# Patient Record
Sex: MALE | Race: OTHER | NOT HISPANIC OR LATINO | URBAN - METROPOLITAN AREA
[De-identification: names, ages, dates, MRNs, and addresses within clinical notes are randomized per-mention and may not be internally consistent; named-entity substitution may affect disease eponyms.]

---

## 2021-08-28 ENCOUNTER — TELEPHONE ENCOUNTER (OUTPATIENT)
Dept: URBAN - METROPOLITAN AREA CLINIC 13 | Facility: CLINIC | Age: 61
End: 2021-08-28

## 2021-08-29 ENCOUNTER — TELEPHONE ENCOUNTER (OUTPATIENT)
Dept: URBAN - METROPOLITAN AREA CLINIC 13 | Facility: CLINIC | Age: 61
End: 2021-08-29

## 2022-03-03 ENCOUNTER — OFFICE VISIT (OUTPATIENT)
Dept: URBAN - METROPOLITAN AREA CLINIC 48 | Facility: CLINIC | Age: 62
End: 2022-03-03
Payer: COMMERCIAL

## 2022-03-03 ENCOUNTER — DASHBOARD ENCOUNTERS (OUTPATIENT)
Age: 62
End: 2022-03-03

## 2022-03-03 VITALS
DIASTOLIC BLOOD PRESSURE: 75 MMHG | HEART RATE: 80 BPM | OXYGEN SATURATION: 94 % | BODY MASS INDEX: 38.36 KG/M2 | HEIGHT: 76 IN | SYSTOLIC BLOOD PRESSURE: 158 MMHG | TEMPERATURE: 98.1 F | WEIGHT: 315 LBS

## 2022-03-03 DIAGNOSIS — R10.13 EPIGASTRIC PAIN: ICD-10-CM

## 2022-03-03 DIAGNOSIS — K92.1 MELENA: ICD-10-CM

## 2022-03-03 DIAGNOSIS — D50.9 IRON DEFICIENCY ANEMIA, UNSPECIFIED IRON DEFICIENCY ANEMIA TYPE: ICD-10-CM

## 2022-03-03 DIAGNOSIS — Z83.71 FAMILY HISTORY OF COLONIC POLYPS: ICD-10-CM

## 2022-03-03 PROBLEM — 87522002: Status: ACTIVE | Noted: 2022-03-03

## 2022-03-03 PROCEDURE — 99204 OFFICE O/P NEW MOD 45 MIN: CPT | Performed by: INTERNAL MEDICINE

## 2022-03-03 RX ORDER — LOSARTAN POTASSIUM 100 MG/1
TAKE ONE TABLET BY MOUTH ONE TIME DAILY TABLET ORAL
Qty: 90 | Refills: 1 | Status: ACTIVE | COMMUNITY

## 2022-03-03 RX ORDER — METFORMIN HYDROCHLORIDE 500 MG/1
1 TABLET WITH EVENING MEAL TABLET, EXTENDED RELEASE ORAL TWICE A DAY
Status: ACTIVE | COMMUNITY

## 2022-03-03 RX ORDER — PRAVASTATIN SODIUM 80 MG/1
TAKE ONE TABLET BY MOUTH ONE TIME DAILY TABLET ORAL
Qty: 90 | Refills: 1 | Status: ACTIVE | COMMUNITY

## 2022-03-03 RX ORDER — CARVEDILOL 25 MG/1
TAKE TWO TABLETS BY MOUTH TWICE A DAY WITH A MEAL TABLET, FILM COATED ORAL
Qty: 360 | Refills: 1 | Status: ACTIVE | COMMUNITY

## 2022-03-03 RX ORDER — PIOGLITAZONE 45 MG/1
TAKE ONE TABLET BY MOUTH ONE TIME DAILY FOR DIABETES TABLET ORAL
Qty: 90 | Refills: 1 | Status: ACTIVE | COMMUNITY

## 2022-03-03 RX ORDER — ERGOCALCIFEROL 1.25 MG/1
NULL DIAGNOSIS UNAVAILABLE CAPSULE, LIQUID FILLED ORAL
Qty: 12 | Refills: 1 | Status: ACTIVE | COMMUNITY

## 2022-03-03 RX ORDER — ALLOPURINOL 300 MG/1
TAKE ONE TABLET BY MOUTH ONE TIME DAILY AS NEEDED FOR GOUT PREVENTION TABLET ORAL
Qty: 90 | Refills: 1 | Status: ACTIVE | COMMUNITY

## 2022-03-03 RX ORDER — GLIPIZIDE 10 MG/1
TAKE ONE TABLET BY MOUTH TWICE A DAY WITH MEALS TABLET, FILM COATED, EXTENDED RELEASE ORAL
Qty: 180 | Refills: 0 | Status: ACTIVE | COMMUNITY

## 2022-03-03 RX ORDER — POTASSIUM CHLORIDE 750 MG/1
TAKE ONE CAPSULE BY MOUTH TWICE A DAY CAPSULE, EXTENDED RELEASE ORAL
Qty: 179 | Refills: 0 | Status: ACTIVE | COMMUNITY

## 2022-03-03 RX ORDER — VITAMIN A 2400 MCG
1 TABLET CAPSULE ORAL ONCE A DAY
Status: ACTIVE | COMMUNITY

## 2022-03-03 RX ORDER — GABAPENTIN 100 MG/1
TAKE 1 TO 2 CAPSULES BY MOUTH AT BEDTIME AS NEEDED FOR NERVE PAIN CAPSULE ORAL
Qty: 50 | Refills: 1 | Status: ACTIVE | COMMUNITY

## 2022-03-03 RX ORDER — DOXAZOSIN 4 MG/1
TAKE ONE TABLET BY MOUTH AT BEDTIME TABLET ORAL
Qty: 90 | Refills: 1 | Status: ACTIVE | COMMUNITY

## 2022-03-03 RX ORDER — ISOSORBIDE MONONITRATE 30 MG/1
TAKE ONE TABLET BY MOUTH ONE TIME DAILY TABLET, EXTENDED RELEASE ORAL
Qty: 90 | Refills: 1 | Status: ACTIVE | COMMUNITY

## 2022-03-03 RX ORDER — OMEPRAZOLE 40 MG/1
TAKE ONE CAPSULE BY MOUTH ONE TIME DAILY CAPSULE, DELAYED RELEASE ORAL
Qty: 90 | Refills: 0 | Status: ACTIVE | COMMUNITY

## 2022-03-03 RX ORDER — SUCRALFATE 1 G/1
1 TABLET ON AN EMPTY STOMACH TABLET ORAL THREE TIMES A DAY
Qty: 42 TABLET | Refills: 1 | OUTPATIENT
Start: 2022-03-03 | End: 2022-03-31

## 2022-03-03 RX ORDER — ASPIRIN 81 MG/1
TAKE ONE TABLET BY MOUTH ONE TIME DAILY WITH FOOD TABLET, COATED ORAL
Qty: 90 | Refills: 1 | Status: ACTIVE | COMMUNITY

## 2022-03-03 RX ORDER — NIFEDIPINE 30 MG/1
TAKE ONE TABLET BY MOUTH ONE TIME DAILY TABLET, EXTENDED RELEASE ORAL
Qty: 90 | Refills: 1 | Status: ACTIVE | COMMUNITY

## 2022-03-03 NOTE — HPI-TODAY'S VISIT:
Eliceo Kerns is a 60 y/o male who is being referred by Ledy Bustos NP, for evaluation of dark stools and anemia. Labs were checked 3/1 showing Hgb of 9.7; MCV 93; Iron and % saturation low at 30 and 10 respectively, and has just been started on oral iron supplentation. Per review of EPIC chart, he has chronic anemia, but Hgb has steadily dropped as it was 12.4 in 6/2021 and 11 in 11/2021. He states he has noticed his stools appearing black over the last couple of weeks; having 1 formed stool/day. He also sees occasional BRBPR which he attributes to internal hemorrhoids. He takes 81 mg aspirin daily and is also on Xarelto for A-fib (recently changed from Eliquis due to insurance coverage). He recently took Ibuprofen daily for a couple of weeks. He has taken Omeprazole 40 mg on an as needed basis for upper abdominal pains, but recently has been taking it daily, and pain has improved. He has not had a colonoscopy since 2008 which showed internal hemorrhoids; he reports h/o colon polyps in his brother. Additionally, he mentions having Alpha-gal allergy.

## 2022-03-09 ENCOUNTER — OFFICE VISIT (OUTPATIENT)
Dept: URBAN - METROPOLITAN AREA SURGERY CENTER 28 | Facility: SURGERY CENTER | Age: 62
End: 2022-03-09
Payer: COMMERCIAL

## 2022-03-09 DIAGNOSIS — D50.9 ANEMIA: ICD-10-CM

## 2022-03-09 DIAGNOSIS — K64.1 BLEEDING GRADE II HEMORRHOIDS: ICD-10-CM

## 2022-03-09 DIAGNOSIS — K57.30 ACQUIRED DIVERTICULOSIS OF COLON: ICD-10-CM

## 2022-03-09 DIAGNOSIS — K92.1 ACUTE MELENA: ICD-10-CM

## 2022-03-09 PROCEDURE — 43235 EGD DIAGNOSTIC BRUSH WASH: CPT | Performed by: INTERNAL MEDICINE

## 2022-03-09 PROCEDURE — G8907 PT DOC NO EVENTS ON DISCHARG: HCPCS | Performed by: INTERNAL MEDICINE

## 2022-03-09 PROCEDURE — 45378 DIAGNOSTIC COLONOSCOPY: CPT | Performed by: INTERNAL MEDICINE

## 2022-03-09 RX ORDER — DOXAZOSIN 4 MG/1
TAKE ONE TABLET BY MOUTH AT BEDTIME TABLET ORAL
Qty: 90 | Refills: 1 | Status: ACTIVE | COMMUNITY

## 2022-03-09 RX ORDER — PIOGLITAZONE 45 MG/1
TAKE ONE TABLET BY MOUTH ONE TIME DAILY FOR DIABETES TABLET ORAL
Qty: 90 | Refills: 1 | Status: ACTIVE | COMMUNITY

## 2022-03-09 RX ORDER — CARVEDILOL 25 MG/1
TAKE TWO TABLETS BY MOUTH TWICE A DAY WITH A MEAL TABLET, FILM COATED ORAL
Qty: 360 | Refills: 1 | Status: ACTIVE | COMMUNITY

## 2022-03-09 RX ORDER — ASPIRIN 81 MG/1
TAKE ONE TABLET BY MOUTH ONE TIME DAILY WITH FOOD TABLET, COATED ORAL
Qty: 90 | Refills: 1 | Status: ACTIVE | COMMUNITY

## 2022-03-09 RX ORDER — LOSARTAN POTASSIUM 100 MG/1
TAKE ONE TABLET BY MOUTH ONE TIME DAILY TABLET ORAL
Qty: 90 | Refills: 1 | Status: ACTIVE | COMMUNITY

## 2022-03-09 RX ORDER — ERGOCALCIFEROL 1.25 MG/1
NULL DIAGNOSIS UNAVAILABLE CAPSULE, LIQUID FILLED ORAL
Qty: 12 | Refills: 1 | Status: ACTIVE | COMMUNITY

## 2022-03-09 RX ORDER — GLIPIZIDE 10 MG/1
TAKE ONE TABLET BY MOUTH TWICE A DAY WITH MEALS TABLET, FILM COATED, EXTENDED RELEASE ORAL
Qty: 180 | Refills: 0 | Status: ACTIVE | COMMUNITY

## 2022-03-09 RX ORDER — ALLOPURINOL 300 MG/1
TAKE ONE TABLET BY MOUTH ONE TIME DAILY AS NEEDED FOR GOUT PREVENTION TABLET ORAL
Qty: 90 | Refills: 1 | Status: ACTIVE | COMMUNITY

## 2022-03-09 RX ORDER — VITAMIN A 2400 MCG
1 TABLET CAPSULE ORAL ONCE A DAY
Status: ACTIVE | COMMUNITY

## 2022-03-09 RX ORDER — GABAPENTIN 100 MG/1
TAKE 1 TO 2 CAPSULES BY MOUTH AT BEDTIME AS NEEDED FOR NERVE PAIN CAPSULE ORAL
Qty: 50 | Refills: 1 | Status: ACTIVE | COMMUNITY

## 2022-03-09 RX ORDER — ISOSORBIDE MONONITRATE 30 MG/1
TAKE ONE TABLET BY MOUTH ONE TIME DAILY TABLET, EXTENDED RELEASE ORAL
Qty: 90 | Refills: 1 | Status: ACTIVE | COMMUNITY

## 2022-03-09 RX ORDER — METFORMIN HYDROCHLORIDE 500 MG/1
1 TABLET WITH EVENING MEAL TABLET, EXTENDED RELEASE ORAL TWICE A DAY
Status: ACTIVE | COMMUNITY

## 2022-03-09 RX ORDER — POTASSIUM CHLORIDE 750 MG/1
TAKE ONE CAPSULE BY MOUTH TWICE A DAY CAPSULE, EXTENDED RELEASE ORAL
Qty: 179 | Refills: 0 | Status: ACTIVE | COMMUNITY

## 2022-03-09 RX ORDER — PRAVASTATIN SODIUM 80 MG/1
TAKE ONE TABLET BY MOUTH ONE TIME DAILY TABLET ORAL
Qty: 90 | Refills: 1 | Status: ACTIVE | COMMUNITY

## 2022-03-09 RX ORDER — OMEPRAZOLE 40 MG/1
TAKE ONE CAPSULE BY MOUTH ONE TIME DAILY CAPSULE, DELAYED RELEASE ORAL
Qty: 90 | Refills: 0 | Status: ACTIVE | COMMUNITY

## 2022-03-09 RX ORDER — SUCRALFATE 1 G/1
1 TABLET ON AN EMPTY STOMACH TABLET ORAL THREE TIMES A DAY
Qty: 42 TABLET | Refills: 1 | Status: ACTIVE | COMMUNITY
Start: 2022-03-03 | End: 2022-03-31

## 2022-03-09 RX ORDER — NIFEDIPINE 30 MG/1
TAKE ONE TABLET BY MOUTH ONE TIME DAILY TABLET, EXTENDED RELEASE ORAL
Qty: 90 | Refills: 1 | Status: ACTIVE | COMMUNITY

## 2022-04-04 ENCOUNTER — OFFICE VISIT (OUTPATIENT)
Dept: URBAN - METROPOLITAN AREA CLINIC 48 | Facility: CLINIC | Age: 62
End: 2022-04-04

## 2024-11-14 ENCOUNTER — OFFICE VISIT (OUTPATIENT)
Dept: URBAN - METROPOLITAN AREA CLINIC 46 | Facility: CLINIC | Age: 64
End: 2024-11-14

## 2024-11-14 RX ORDER — CARVEDILOL 25 MG/1
TAKE TWO TABLETS BY MOUTH TWICE A DAY WITH A MEAL TABLET, FILM COATED ORAL
Qty: 360 | Refills: 1 | Status: ACTIVE | COMMUNITY

## 2024-11-14 RX ORDER — ALLOPURINOL 300 MG/1
TAKE ONE TABLET BY MOUTH ONE TIME DAILY AS NEEDED FOR GOUT PREVENTION TABLET ORAL
Qty: 90 | Refills: 1 | Status: ACTIVE | COMMUNITY

## 2024-11-14 RX ORDER — POTASSIUM CHLORIDE 750 MG/1
TAKE ONE CAPSULE BY MOUTH TWICE A DAY CAPSULE, EXTENDED RELEASE ORAL
Qty: 179 | Refills: 0 | Status: ACTIVE | COMMUNITY

## 2024-11-14 RX ORDER — DOXAZOSIN 4 MG/1
TAKE ONE TABLET BY MOUTH AT BEDTIME TABLET ORAL
Qty: 90 | Refills: 1 | Status: ACTIVE | COMMUNITY

## 2024-11-14 RX ORDER — PRAVASTATIN SODIUM 80 MG/1
TAKE ONE TABLET BY MOUTH ONE TIME DAILY TABLET ORAL
Qty: 90 | Refills: 1 | Status: ACTIVE | COMMUNITY

## 2024-11-14 RX ORDER — ISOSORBIDE MONONITRATE 30 MG/1
TAKE ONE TABLET BY MOUTH ONE TIME DAILY TABLET, EXTENDED RELEASE ORAL
Qty: 90 | Refills: 1 | Status: ACTIVE | COMMUNITY

## 2024-11-14 RX ORDER — LOSARTAN POTASSIUM 100 MG/1
TAKE ONE TABLET BY MOUTH ONE TIME DAILY TABLET ORAL
Qty: 90 | Refills: 1 | Status: ACTIVE | COMMUNITY

## 2024-11-14 RX ORDER — ASPIRIN 81 MG/1
TAKE ONE TABLET BY MOUTH ONE TIME DAILY WITH FOOD TABLET, COATED ORAL
Qty: 90 | Refills: 1 | Status: ACTIVE | COMMUNITY

## 2024-11-14 RX ORDER — METFORMIN HYDROCHLORIDE 500 MG/1
1 TABLET WITH EVENING MEAL TABLET, EXTENDED RELEASE ORAL TWICE A DAY
Status: ACTIVE | COMMUNITY

## 2024-11-14 RX ORDER — GABAPENTIN 100 MG/1
TAKE 1 TO 2 CAPSULES BY MOUTH AT BEDTIME AS NEEDED FOR NERVE PAIN CAPSULE ORAL
Qty: 50 | Refills: 1 | Status: ACTIVE | COMMUNITY

## 2024-11-14 RX ORDER — OMEPRAZOLE 40 MG/1
TAKE ONE CAPSULE BY MOUTH ONE TIME DAILY CAPSULE, DELAYED RELEASE ORAL
Qty: 90 | Refills: 0 | Status: ACTIVE | COMMUNITY

## 2024-11-14 RX ORDER — ERGOCALCIFEROL 1.25 MG/1
NULL DIAGNOSIS UNAVAILABLE CAPSULE, LIQUID FILLED ORAL
Qty: 12 | Refills: 1 | Status: ACTIVE | COMMUNITY

## 2024-11-14 RX ORDER — PIOGLITAZONE 45 MG/1
TAKE ONE TABLET BY MOUTH ONE TIME DAILY FOR DIABETES TABLET ORAL
Qty: 90 | Refills: 1 | Status: ACTIVE | COMMUNITY

## 2024-11-14 RX ORDER — VITAMIN A 2400 MCG
1 TABLET CAPSULE ORAL ONCE A DAY
Status: ACTIVE | COMMUNITY

## 2024-11-14 RX ORDER — GLIPIZIDE 10 MG/1
TAKE ONE TABLET BY MOUTH TWICE A DAY WITH MEALS TABLET, FILM COATED, EXTENDED RELEASE ORAL
Qty: 180 | Refills: 0 | Status: ACTIVE | COMMUNITY

## 2024-11-14 RX ORDER — NIFEDIPINE 30 MG/1
TAKE ONE TABLET BY MOUTH ONE TIME DAILY TABLET, EXTENDED RELEASE ORAL
Qty: 90 | Refills: 1 | Status: ACTIVE | COMMUNITY

## 2024-11-14 NOTE — HPI-TODAY'S VISIT:
64-year-old male presents for low iron.  Last OV 3/2022.  Patient was referred for evaluation of dark stools and anemia.  Labs 3/1/2022 showed hemoglobin 9.7, MCV 93.  Iron low at 30, percent sat 10.  Patient has been started on p.o. iron supplementation.  History of chronic anemia, but hemoglobin steadily decreasing from 12.4 in 6/2021, 11 in 11/2021.  Reported black stools, occasional BRBPR, attributed to internal hemorrhoids.  Patient was taking Xarelto for A-fib, which was changed to Eliquis.  Also took ibuprofen daily for a couple of weeks.  On omeprazole 40 mg as needed for epigastric pain, and was taking it daily at last OV with improvement.  Colonoscopy in May 2008 showed internal hemorrhoids.  Patient has family history colon polyps in his brother.  Also has alpha gal allergy. At last OV, Rx sucralfate x 14 days, ordered EGD/colon.  EGD 3/9/2022 was normal.  Colonoscopy showed diverticulosis, internal hemorrhoids.  Recommended considering PillCam, but this was not done. No recent labs available.

## 2024-11-14 NOTE — PHYSICAL EXAM NECK/THYROID:
normal appearance , without tenderness upon palpation [Follow-Up Visit] : a follow-up visit for [FreeTextEntry2] : Hemochromatosis

## 2024-11-15 ENCOUNTER — CLAIMS CREATED FROM THE CLAIM WINDOW (OUTPATIENT)
Dept: URBAN - METROPOLITAN AREA MEDICAL CENTER 1 | Facility: MEDICAL CENTER | Age: 64
End: 2024-11-15
Payer: COMMERCIAL

## 2024-11-15 DIAGNOSIS — K22.89 OTHER SPECIFIED DISEASE OF ESOPHAGUS: ICD-10-CM

## 2024-11-15 DIAGNOSIS — K31.811 ACQUIRED ARTERIOVENOUS MALFORMATION OF DUODENUM WITH HEMORRHAGE: ICD-10-CM

## 2024-11-15 DIAGNOSIS — K92.1 ACUTE MELENA: ICD-10-CM

## 2024-11-15 DIAGNOSIS — D62 ABLA (ACUTE BLOOD LOSS ANEMIA): ICD-10-CM

## 2024-11-15 PROCEDURE — 99255 IP/OBS CONSLTJ NEW/EST HI 80: CPT | Performed by: INTERNAL MEDICINE

## 2024-11-15 PROCEDURE — 43255 EGD CONTROL BLEEDING ANY: CPT | Performed by: INTERNAL MEDICINE

## 2024-11-15 PROCEDURE — 99205 OFFICE O/P NEW HI 60 MIN: CPT | Performed by: INTERNAL MEDICINE

## 2024-11-15 PROCEDURE — 43235 EGD DIAGNOSTIC BRUSH WASH: CPT | Performed by: INTERNAL MEDICINE

## 2024-11-16 ENCOUNTER — CLAIMS CREATED FROM THE CLAIM WINDOW (OUTPATIENT)
Dept: URBAN - METROPOLITAN AREA MEDICAL CENTER 1 | Facility: MEDICAL CENTER | Age: 64
End: 2024-11-16

## 2024-11-16 PROCEDURE — 99231 SBSQ HOSP IP/OBS SF/LOW 25: CPT | Performed by: INTERNAL MEDICINE

## 2025-05-29 ENCOUNTER — TELEPHONE ENCOUNTER (OUTPATIENT)
Dept: URBAN - METROPOLITAN AREA CLINIC 23 | Facility: CLINIC | Age: 65
End: 2025-05-29